# Patient Record
Sex: FEMALE
[De-identification: names, ages, dates, MRNs, and addresses within clinical notes are randomized per-mention and may not be internally consistent; named-entity substitution may affect disease eponyms.]

---

## 2018-09-25 ENCOUNTER — HOSPITAL ENCOUNTER (OUTPATIENT)
Dept: HOSPITAL 14 - H.OPSURG | Age: 53
Discharge: HOME | End: 2018-09-25
Attending: ANESTHESIOLOGY
Payer: COMMERCIAL

## 2018-09-25 VITALS
DIASTOLIC BLOOD PRESSURE: 72 MMHG | SYSTOLIC BLOOD PRESSURE: 112 MMHG | RESPIRATION RATE: 18 BRPM | TEMPERATURE: 97.6 F | HEART RATE: 68 BPM

## 2018-09-25 VITALS — OXYGEN SATURATION: 100 %

## 2018-09-25 VITALS — BODY MASS INDEX: 26.5 KG/M2

## 2018-09-25 DIAGNOSIS — M19.90: ICD-10-CM

## 2018-09-25 DIAGNOSIS — M51.16: Primary | ICD-10-CM

## 2018-09-25 PROCEDURE — 64484 NJX AA&/STRD TFRM EPI L/S EA: CPT

## 2018-09-25 PROCEDURE — 64483 NJX AA&/STRD TFRM EPI L/S 1: CPT

## 2018-09-26 NOTE — RAD
Date of service: 



09/25/2018



PROCEDURE:  Fluoroscopy up to 1 hr.



HISTORY:

PAIN MANAGEMENT



COMPARISON:

None



TECHNIQUE:

Standard protocol for this study/examination.



FINDINGS:

 Total fluoroscopic time (continuous mode) utilized during the 

procedure 60.7 (seconds). 



Total exam DLP: 20.16 (mGy).



IMPRESSION:

Less than 1 hr fluoroscopic assistance provided during performance of 

the procedure.